# Patient Record
Sex: FEMALE | Race: BLACK OR AFRICAN AMERICAN | NOT HISPANIC OR LATINO | URBAN - METROPOLITAN AREA
[De-identification: names, ages, dates, MRNs, and addresses within clinical notes are randomized per-mention and may not be internally consistent; named-entity substitution may affect disease eponyms.]

---

## 2018-11-30 ENCOUNTER — OUTPATIENT (OUTPATIENT)
Dept: OUTPATIENT SERVICES | Facility: HOSPITAL | Age: 38
LOS: 1 days | End: 2018-11-30

## 2018-11-30 VITALS
WEIGHT: 203.93 LBS | RESPIRATION RATE: 14 BRPM | OXYGEN SATURATION: 99 % | SYSTOLIC BLOOD PRESSURE: 128 MMHG | DIASTOLIC BLOOD PRESSURE: 78 MMHG | TEMPERATURE: 97 F | HEIGHT: 66 IN | HEART RATE: 65 BPM

## 2018-11-30 DIAGNOSIS — D25.9 LEIOMYOMA OF UTERUS, UNSPECIFIED: ICD-10-CM

## 2018-11-30 DIAGNOSIS — Z98.890 OTHER SPECIFIED POSTPROCEDURAL STATES: Chronic | ICD-10-CM

## 2018-11-30 LAB
BLD GP AB SCN SERPL QL: NEGATIVE — SIGNIFICANT CHANGE UP
HCT VFR BLD CALC: 33.5 % — LOW (ref 34.5–45)
HGB BLD-MCNC: 10.5 G/DL — LOW (ref 11.5–15.5)
MCHC RBC-ENTMCNC: 25.9 PG — LOW (ref 27–34)
MCHC RBC-ENTMCNC: 31.3 % — LOW (ref 32–36)
MCV RBC AUTO: 82.7 FL — SIGNIFICANT CHANGE UP (ref 80–100)
NRBC # FLD: 0 — SIGNIFICANT CHANGE UP
PLATELET # BLD AUTO: 287 K/UL — SIGNIFICANT CHANGE UP (ref 150–400)
PMV BLD: 11.7 FL — SIGNIFICANT CHANGE UP (ref 7–13)
RBC # BLD: 4.05 M/UL — SIGNIFICANT CHANGE UP (ref 3.8–5.2)
RBC # FLD: 14.9 % — HIGH (ref 10.3–14.5)
RH IG SCN BLD-IMP: POSITIVE — SIGNIFICANT CHANGE UP
WBC # BLD: 5.89 K/UL — SIGNIFICANT CHANGE UP (ref 3.8–10.5)
WBC # FLD AUTO: 5.89 K/UL — SIGNIFICANT CHANGE UP (ref 3.8–10.5)

## 2018-11-30 RX ORDER — SODIUM CHLORIDE 9 MG/ML
1000 INJECTION, SOLUTION INTRAVENOUS
Qty: 0 | Refills: 0 | Status: DISCONTINUED | OUTPATIENT
Start: 2018-12-17 | End: 2018-12-19

## 2018-11-30 NOTE — H&P PST ADULT - NEGATIVE GASTROINTESTINAL SYMPTOMS
no melena/no vomiting/no diarrhea/no constipation/no change in bowel habits/no nausea/no abdominal pain

## 2018-11-30 NOTE — H&P PST ADULT - NEGATIVE CARDIOVASCULAR SYMPTOMS
no paroxysmal nocturnal dyspnea/no palpitations/no dyspnea on exertion/no peripheral edema/no orthopnea/no claudication

## 2018-11-30 NOTE — H&P PST ADULT - HISTORY OF PRESENT ILLNESS
38 y/0  female with hx uterine fibroids, s/p Myomectomy 2014, present for preop evaluation for recurrent uterine fibroids. Pt states heavy vaginal bleeding with her menses started >1 year ago. Pt was referred for further evaluation; seen by Dr Alves, had sonogram/ transvaginal with difficulty to visualized internally. Now scheduled for Myomectomy on 12/17/18.  pt has been Anemic & on Iron supplement. 38 y/0  female with hx uterine fibroids, s/p Myomectomy 2014, present for preop evaluation for recurrent uterine fibroids. Pt states heavy vaginal bleeding with her menses started >1 year ago. Pt was referred for further evaluation; seen by Dr Alves, had sonogram/ transvaginal with difficulty to visualized internally. Now scheduled for Abdominal Myomectomy on 12/17/18.  Pt has been Anemic & now on Iron supplement.

## 2018-11-30 NOTE — H&P PST ADULT - NEGATIVE GENERAL GENITOURINARY SYMPTOMS
no urinary hesitancy/no dysuria/no incontinence/no hematuria/no renal colic/no flank pain L/no gas in urine

## 2018-11-30 NOTE — H&P PST ADULT - NEGATIVE MUSCULOSKELETAL SYMPTOMS
no arm pain L/no neck pain/no back pain/no joint swelling/no stiffness no back pain/no joint swelling/no stiffness/no arm pain L/no arthritis/no arthralgia/no neck pain

## 2018-11-30 NOTE — H&P PST ADULT - NSANTHOSAYNRD_GEN_A_CORE
No. IBETH screening performed.  STOP BANG Legend: 0-2 = LOW Risk; 3-4 = INTERMEDIATE Risk; 5-8 = HIGH Risk

## 2018-11-30 NOTE — H&P PST ADULT - ATTENDING COMMENTS
39 yo presents for abdominal myomectomy due to symptomatic fibroid uterus. The patient has heavy menses, pelvic pressure and increased abdominal girth causing extreme discomfort.  The patient has had abdominal myomectomy in the past and states about 11 fibroids were removed. Risks, benefits and alternatives of repeat myomectomy discussed with patient. Pelvic ultrasound shows many intramural and subserosal myomas the largest 12cm in diameter.  The decision was made to proceed with another myomectomy.  Risks include but not limited to scar tissue formation, intraop acute blood loss, infection, injury to adjacent organs. All questions and concerns addressed to full satisfaction. Anticipate uncomplicated intraop and postop course. MBeauvil

## 2018-11-30 NOTE — H&P PST ADULT - RS GEN PE MLT RESP DETAILS PC
airway patent/no rales/clear to auscultation bilaterally/no wheezes/breath sounds equal/no rhonchi/respirations non-labored

## 2018-11-30 NOTE — H&P PST ADULT - NEGATIVE ENMT SYMPTOMS
no sinus symptoms/no nasal congestion/no nasal obstruction/no post-nasal discharge/no dry mouth/no tinnitus/no gum bleeding/no vertigo/no ear pain/no hearing difficulty

## 2018-11-30 NOTE — H&P PST ADULT - FAMILY HISTORY
Mother  Still living? Yes, Estimated age: Age Unknown  Family history of hypertension in mother, Age at diagnosis: Age Unknown  Family history of diabetes mellitus (DM), Age at diagnosis: Age Unknown  Family history of AICD (automatic internal cardiac defibrillator), Age at diagnosis: Age Unknown     Father  Still living? Yes, Estimated age: Age Unknown  Family history of prostate cancer in father, Age at diagnosis: Age Unknown     Sibling  Still living? Yes, Estimated age: Age Unknown  Family history of lymphoma, Age at diagnosis: Age Unknown

## 2018-11-30 NOTE — H&P PST ADULT - NEGATIVE NEUROLOGICAL SYMPTOMS
no focal seizures/no tremors/no loss of sensation/no headache/no weakness/no vertigo no headache/no focal seizures/no vertigo/no weakness/no tremors/no loss of sensation/no difficulty walking

## 2018-12-17 ENCOUNTER — INPATIENT (INPATIENT)
Facility: HOSPITAL | Age: 38
LOS: 2 days | Discharge: HOME | End: 2018-12-20
Attending: OBSTETRICS & GYNECOLOGY | Admitting: OBSTETRICS & GYNECOLOGY
Payer: MEDICAID

## 2018-12-17 VITALS
HEART RATE: 75 BPM | RESPIRATION RATE: 16 BRPM | TEMPERATURE: 99 F | SYSTOLIC BLOOD PRESSURE: 122 MMHG | HEIGHT: 66 IN | WEIGHT: 203.93 LBS | DIASTOLIC BLOOD PRESSURE: 63 MMHG | OXYGEN SATURATION: 100 %

## 2018-12-17 DIAGNOSIS — D25.9 LEIOMYOMA OF UTERUS, UNSPECIFIED: ICD-10-CM

## 2018-12-17 DIAGNOSIS — Z98.890 OTHER SPECIFIED POSTPROCEDURAL STATES: Chronic | ICD-10-CM

## 2018-12-17 LAB
BASE EXCESS BLDV CALC-SCNC: -1.5 MMOL/L — SIGNIFICANT CHANGE UP
BLD GP AB SCN SERPL QL: NEGATIVE — SIGNIFICANT CHANGE UP
GAS PNL BLDV: 139 MMOL/L — SIGNIFICANT CHANGE UP (ref 136–146)
GLUCOSE BLDV-MCNC: 114 — HIGH (ref 70–99)
HCG UR QL: NEGATIVE — SIGNIFICANT CHANGE UP
HCO3 BLDV-SCNC: 23 MMOL/L — SIGNIFICANT CHANGE UP (ref 20–27)
HCT VFR BLDV CALC: 27.5 % — LOW (ref 34.5–45)
HGB BLDV-MCNC: 8.9 G/DL — LOW (ref 11.5–15.5)
PCO2 BLDV: 43 MMHG — SIGNIFICANT CHANGE UP (ref 41–51)
PH BLDV: 7.35 PH — SIGNIFICANT CHANGE UP (ref 7.32–7.43)
PO2 BLDV: 54 MMHG — HIGH (ref 35–40)
POTASSIUM BLDV-SCNC: 3.8 MMOL/L — SIGNIFICANT CHANGE UP (ref 3.4–4.5)
RH IG SCN BLD-IMP: POSITIVE — SIGNIFICANT CHANGE UP
SAO2 % BLDV: 84.1 % — SIGNIFICANT CHANGE UP (ref 60–85)

## 2018-12-17 PROCEDURE — 88305 TISSUE EXAM BY PATHOLOGIST: CPT | Mod: 26

## 2018-12-17 RX ORDER — OMEGA-3 ACID ETHYL ESTERS 1 G
1 CAPSULE ORAL
Qty: 0 | Refills: 0 | COMMUNITY

## 2018-12-17 RX ORDER — POTASSIUM CHLORIDE 20 MEQ
10 PACKET (EA) ORAL ONCE
Qty: 0 | Refills: 0 | Status: COMPLETED | OUTPATIENT
Start: 2018-12-17 | End: 2018-12-17

## 2018-12-17 RX ORDER — NALOXONE HYDROCHLORIDE 4 MG/.1ML
0.1 SPRAY NASAL
Qty: 0 | Refills: 0 | Status: DISCONTINUED | OUTPATIENT
Start: 2018-12-17 | End: 2018-12-18

## 2018-12-17 RX ORDER — HYDROMORPHONE HYDROCHLORIDE 2 MG/ML
0.5 INJECTION INTRAMUSCULAR; INTRAVENOUS; SUBCUTANEOUS
Qty: 0 | Refills: 0 | Status: DISCONTINUED | OUTPATIENT
Start: 2018-12-17 | End: 2018-12-17

## 2018-12-17 RX ORDER — SODIUM CHLORIDE 9 MG/ML
1000 INJECTION, SOLUTION INTRAVENOUS
Qty: 0 | Refills: 0 | Status: DISCONTINUED | OUTPATIENT
Start: 2018-12-17 | End: 2018-12-17

## 2018-12-17 RX ORDER — ONDANSETRON 8 MG/1
4 TABLET, FILM COATED ORAL EVERY 6 HOURS
Qty: 0 | Refills: 0 | Status: DISCONTINUED | OUTPATIENT
Start: 2018-12-17 | End: 2018-12-18

## 2018-12-17 RX ORDER — HYDROMORPHONE HYDROCHLORIDE 2 MG/ML
30 INJECTION INTRAMUSCULAR; INTRAVENOUS; SUBCUTANEOUS
Qty: 0 | Refills: 0 | Status: DISCONTINUED | OUTPATIENT
Start: 2018-12-17 | End: 2018-12-18

## 2018-12-17 RX ORDER — ONDANSETRON 8 MG/1
4 TABLET, FILM COATED ORAL ONCE
Qty: 0 | Refills: 0 | Status: DISCONTINUED | OUTPATIENT
Start: 2018-12-17 | End: 2018-12-17

## 2018-12-17 RX ORDER — METOPROLOL TARTRATE 50 MG
5 TABLET ORAL ONCE
Qty: 0 | Refills: 0 | Status: COMPLETED | OUTPATIENT
Start: 2018-12-17 | End: 2018-12-17

## 2018-12-17 RX ORDER — HYDROMORPHONE HYDROCHLORIDE 2 MG/ML
1 INJECTION INTRAMUSCULAR; INTRAVENOUS; SUBCUTANEOUS
Qty: 0 | Refills: 0 | Status: DISCONTINUED | OUTPATIENT
Start: 2018-12-17 | End: 2018-12-17

## 2018-12-17 RX ORDER — MAGNESIUM SULFATE 500 MG/ML
2 VIAL (ML) INJECTION ONCE
Qty: 0 | Refills: 0 | Status: COMPLETED | OUTPATIENT
Start: 2018-12-17 | End: 2018-12-17

## 2018-12-17 RX ORDER — HYDROMORPHONE HYDROCHLORIDE 2 MG/ML
0.5 INJECTION INTRAMUSCULAR; INTRAVENOUS; SUBCUTANEOUS
Qty: 0 | Refills: 0 | Status: DISCONTINUED | OUTPATIENT
Start: 2018-12-17 | End: 2018-12-18

## 2018-12-17 RX ORDER — SODIUM CHLORIDE 9 MG/ML
1000 INJECTION, SOLUTION INTRAVENOUS
Qty: 0 | Refills: 0 | Status: DISCONTINUED | OUTPATIENT
Start: 2018-12-17 | End: 2018-12-19

## 2018-12-17 RX ADMIN — HYDROMORPHONE HYDROCHLORIDE 30 MILLILITER(S): 2 INJECTION INTRAMUSCULAR; INTRAVENOUS; SUBCUTANEOUS at 23:49

## 2018-12-17 RX ADMIN — Medication 50 GRAM(S): at 21:45

## 2018-12-17 RX ADMIN — Medication 5 MILLIGRAM(S): at 21:45

## 2018-12-17 RX ADMIN — Medication 100 MILLIEQUIVALENT(S): at 21:45

## 2018-12-17 RX ADMIN — HYDROMORPHONE HYDROCHLORIDE 0.5 MILLIGRAM(S): 2 INJECTION INTRAMUSCULAR; INTRAVENOUS; SUBCUTANEOUS at 21:15

## 2018-12-17 RX ADMIN — HYDROMORPHONE HYDROCHLORIDE 30 MILLILITER(S): 2 INJECTION INTRAMUSCULAR; INTRAVENOUS; SUBCUTANEOUS at 20:51

## 2018-12-17 RX ADMIN — SODIUM CHLORIDE 30 MILLILITER(S): 9 INJECTION, SOLUTION INTRAVENOUS at 22:01

## 2018-12-17 RX ADMIN — HYDROMORPHONE HYDROCHLORIDE 0.5 MILLIGRAM(S): 2 INJECTION INTRAMUSCULAR; INTRAVENOUS; SUBCUTANEOUS at 20:51

## 2018-12-17 NOTE — BRIEF OPERATIVE NOTE - OPERATION/FINDINGS
Extension of lower midline exploratory laparotomy, lysis of adhesions between colon and posterior uterus, and lysis of the bladder off the anterior uterus/fibroid. L ureterolysis posterior to the uterus. Bladder interrogated with methylene blue, distended nicely with out evidence of leak.     The largest fibroid (anterior) was partially resected because the L portion was potentially involving the ureter (ureterolysis would have been required) and the risk was not considered worth the benefit.
14 week sized myomatous uterus.  Extensive adhesions of posterior uterine wall to bowel and anterior lower uterine segment to bladder.  Largest myoma in lower uterine segment anteriorly.

## 2018-12-17 NOTE — BRIEF OPERATIVE NOTE - PROCEDURE
<<-----Click on this checkbox to enter Procedure Lysis of adhesions  12/17/2018  colon from posterior uterus, bladder from anterior uterus  Active  [id number]

## 2018-12-17 NOTE — CONSULT NOTE ADULT - ASSESSMENT
39yo F with h/o myomectomy for fibroids, now undergoing exlap, myomectomies, intraop consult for adhesions to sigmoid and bladder. Adhesions were lysed, L ureter was lysed to identify course.     - Care per Gyn  - Pain control  - Diet to be advanced as tolerated  - Ambulate as tolerated  - Will continue to follow  - D/W Dr. Rachelle Boothe  [id number]

## 2018-12-17 NOTE — PATIENT PROFILE ADULT - NSSCCAGEALCOHOLGUILTYABOUT_GEN_A_NUR
Detail Level: Detailed Quality 134: Screening For Clinical Depression And Follow-Up Plan: Depression Screening not documented, reason not given Quality 154 Part A: Falls: Risk Assessment (Should Be Reported With Measure 155.): Falls risk assessment completed and documented in the past 12 months. Quality 431: Preventive Care And Screening: Unhealthy Alcohol Use - Screening: Patient screened for unhealthy alcohol use using a single question and scores less than 2 times per year Quality 130: Documentation Of Current Medications In The Medical Record: Current Medications Documented Quality 226: Preventive Care And Screening: Tobacco Use: Screening And Cessation Intervention: Patient screened for tobacco and is an ex-smoker Quality 110: Preventive Care And Screening: Influenza Immunization: Influenza Immunization not Administered because Patient Refused. no

## 2018-12-17 NOTE — CONSULT NOTE ADULT - SUBJECTIVE AND OBJECTIVE BOX
GENERAL SURGERY CONSULT NOTE  [id number]  B team surgery - [facility]    HPI  39yo F with h/o uterine fibroids s/p myomectomy (11 removed) 2014      PAST MEDICAL & SURGICAL HISTORY:  Anemia  Uterine fibroid  History of myomectomy: 2014      Systemic:	[ ] Fever	[ ] Chills	[ ] Night sweats    [ ] Fatigue	[ ] Other  [] Cardiovascular:  [] Pulmonary:  [] Renal/Urologic:  [] Gastrointestinal:   [] Metabolic:  [] Neurologic:  [] Hematologic:  [] ENT:  [] Ophthalmologic:  [] Musculoskeletal:    MEDICATIONS  (STANDING):  HYDROmorphone PCA (1 mG/mL) 30 milliLiter(s) PCA Continuous PCA Continuous  lactated ringers. 1000 milliLiter(s) (150 mL/Hr) IV Continuous <Continuous>  lactated ringers. 1000 milliLiter(s) (30 mL/Hr) IV Continuous <Continuous>  lactated ringers. 1000 milliLiter(s) (125 mL/Hr) IV Continuous <Continuous>  magnesium sulfate  IVPB 2 Gram(s) IV Intermittent once  metoprolol tartrate Injectable 5 milliGRAM(s) IV Push once  potassium chloride  10 mEq/100 mL IVPB 10 milliEquivalent(s) IV Intermittent once    MEDICATIONS  (PRN):  HYDROmorphone  Injectable 0.5 milliGRAM(s) IV Push every 10 minutes PRN Moderate Pain (4 - 6)  HYDROmorphone  Injectable 1 milliGRAM(s) IV Push every 10 minutes PRN Severe Pain (7 - 10)  HYDROmorphone PCA (1 mG/mL) Rescue Clinician Bolus 0.5 milliGRAM(s) IV Push every 15 minutes PRN for Pain Scale GREATER THAN 6  naloxone Injectable 0.1 milliGRAM(s) IV Push every 3 minutes PRN For ANY of the following changes in patient status:  A. RR LESS THAN 10 breaths per minute, B. Oxygen saturation LESS THAN 90%, C. Sedation score of 6  ondansetron Injectable 4 milliGRAM(s) IV Push every 6 hours PRN Nausea  ondansetron Injectable 4 milliGRAM(s) IV Push once PRN Nausea and/or Vomiting      Allergies    No Known Allergies    Intolerances        SOCIAL HISTORY:    FAMILY HISTORY:  Family history of lymphoma (Sibling)  Family history of AICD (automatic internal cardiac defibrillator) (Mother)  Family history of diabetes mellitus (DM) (Mother)  Family history of prostate cancer in father (Father)  Family history of hypertension in mother (Mother)      Vital Signs Last 24 Hrs  T(C): 36.8 (17 Dec 2018 20:35), Max: 37.1 (17 Dec 2018 13:35)  T(F): 98.2 (17 Dec 2018 20:35), Max: 98.8 (17 Dec 2018 13:35)  HR: 78 (17 Dec 2018 21:15) (75 - 84)  BP: 140/57 (17 Dec 2018 21:15) (122/63 - 141/57)  BP(mean): 74 (17 Dec 2018 21:15) (74 - 87)  RR: 19 (17 Dec 2018 21:15) (16 - 26)  SpO2: 100% (17 Dec 2018 21:15) (100% - 100%)    PHYSICAL EXAM:    Constitutional: NAD    Eyes: anicteric    ENMT: no rhinorrhea    Neck: supple    Respiratory: Clear bilaterally, respirations not labored, no retractions    Cardiovascular: RRR, NL S1S2    Gastrointestinal: Soft, ND, NT    Genitourinary: Normal external genitalia    Rectal:    Extremities: No deformities    Vascular: Ext. warm, normal cap refill    Neurological: sensation and motor intact to all extremities    Skin: warm, dry, no rash    Lymph Nodes: no palpable adenopathy      LABS:                RADIOLOGY & ADDITIONAL STUDIES: GENERAL SURGERY CONSULT NOTE  [id number]  B team surgery - [facility]    HPI  37yo F with h/o uterine fibroids s/p myomectomy (11 removed) 2014, taken to the OR today for exploratory laparotomy and resection of fibroids for heavy vaginal bleeding. Intraop, there were adhesions noted posteriorly to the sigmoid, and anteriorly to the bladder. The adhesions were taken down, the left ureter was lysed, and the bladder was interrogated for injury (negative methylene blue leak test). Multiple fibroids were removed during the course of the surgery, including a partial resection of the largest, anterior fibroid.       PAST MEDICAL & SURGICAL HISTORY:  Anemia  Uterine fibroid  History of myomectomy: 2014    ROS: could not be assessed, patient intubated on OR table.     MEDICATIONS  (STANDING):  HYDROmorphone PCA (1 mG/mL) 30 milliLiter(s) PCA Continuous PCA Continuous  lactated ringers. 1000 milliLiter(s) (150 mL/Hr) IV Continuous <Continuous>  lactated ringers. 1000 milliLiter(s) (30 mL/Hr) IV Continuous <Continuous>  lactated ringers. 1000 milliLiter(s) (125 mL/Hr) IV Continuous <Continuous>  magnesium sulfate  IVPB 2 Gram(s) IV Intermittent once  metoprolol tartrate Injectable 5 milliGRAM(s) IV Push once  potassium chloride  10 mEq/100 mL IVPB 10 milliEquivalent(s) IV Intermittent once    MEDICATIONS  (PRN):  HYDROmorphone  Injectable 0.5 milliGRAM(s) IV Push every 10 minutes PRN Moderate Pain (4 - 6)  HYDROmorphone  Injectable 1 milliGRAM(s) IV Push every 10 minutes PRN Severe Pain (7 - 10)  HYDROmorphone PCA (1 mG/mL) Rescue Clinician Bolus 0.5 milliGRAM(s) IV Push every 15 minutes PRN for Pain Scale GREATER THAN 6  naloxone Injectable 0.1 milliGRAM(s) IV Push every 3 minutes PRN For ANY of the following changes in patient status:  A. RR LESS THAN 10 breaths per minute, B. Oxygen saturation LESS THAN 90%, C. Sedation score of 6  ondansetron Injectable 4 milliGRAM(s) IV Push every 6 hours PRN Nausea  ondansetron Injectable 4 milliGRAM(s) IV Push once PRN Nausea and/or Vomiting      Allergies  No Known Allergies    SOCIAL HISTORY:   Single, no children, grad student    FAMILY HISTORY:  Family history of lymphoma (Sibling)  Family history of AICD (automatic internal cardiac defibrillator) (Mother)  Family history of diabetes mellitus (DM) (Mother)  Family history of prostate cancer in father (Father)  Family history of hypertension in mother (Mother)      Vital Signs Last 24 Hrs  T(C): 36.8 (17 Dec 2018 20:35), Max: 37.1 (17 Dec 2018 13:35)  T(F): 98.2 (17 Dec 2018 20:35), Max: 98.8 (17 Dec 2018 13:35)  HR: 78 (17 Dec 2018 21:15) (75 - 84)  BP: 140/57 (17 Dec 2018 21:15) (122/63 - 141/57)  BP(mean): 74 (17 Dec 2018 21:15) (74 - 87)  RR: 19 (17 Dec 2018 21:15) (16 - 26)  SpO2: 100% (17 Dec 2018 21:15) (100% - 100%)    PHYSICAL EXAM:    Constitutional: Intubated on OR table    Respiratory: respirations not labored, no retractions, on vent    Cardiovascular: RRR, NL S1S2 with pvc's    Gastrointestinal: s/p ex lap, prior pfannenstiel surgical scar    Skin: warm, dry, no rash    LABS:    RADIOLOGY & ADDITIONAL STUDIES:

## 2018-12-18 DIAGNOSIS — D25.9 LEIOMYOMA OF UTERUS, UNSPECIFIED: ICD-10-CM

## 2018-12-18 LAB
HCT VFR BLD CALC: 25.5 % — LOW (ref 34.5–45)
HCT VFR BLD CALC: 27.9 % — LOW (ref 34.5–45)
HGB BLD-MCNC: 8.3 G/DL — LOW (ref 11.5–15.5)
HGB BLD-MCNC: 8.6 G/DL — LOW (ref 11.5–15.5)
MCHC RBC-ENTMCNC: 25.9 PG — LOW (ref 27–34)
MCHC RBC-ENTMCNC: 26.3 PG — LOW (ref 27–34)
MCHC RBC-ENTMCNC: 30.8 % — LOW (ref 32–36)
MCHC RBC-ENTMCNC: 32.5 % — SIGNIFICANT CHANGE UP (ref 32–36)
MCV RBC AUTO: 81 FL — SIGNIFICANT CHANGE UP (ref 80–100)
MCV RBC AUTO: 84 FL — SIGNIFICANT CHANGE UP (ref 80–100)
NRBC # FLD: 0 — SIGNIFICANT CHANGE UP
NRBC # FLD: 0 — SIGNIFICANT CHANGE UP
PLATELET # BLD AUTO: 209 K/UL — SIGNIFICANT CHANGE UP (ref 150–400)
PLATELET # BLD AUTO: 214 K/UL — SIGNIFICANT CHANGE UP (ref 150–400)
PMV BLD: 11.6 FL — SIGNIFICANT CHANGE UP (ref 7–13)
PMV BLD: 12 FL — SIGNIFICANT CHANGE UP (ref 7–13)
RBC # BLD: 3.15 M/UL — LOW (ref 3.8–5.2)
RBC # BLD: 3.32 M/UL — LOW (ref 3.8–5.2)
RBC # FLD: 14.4 % — SIGNIFICANT CHANGE UP (ref 10.3–14.5)
RBC # FLD: 14.4 % — SIGNIFICANT CHANGE UP (ref 10.3–14.5)
WBC # BLD: 9.39 K/UL — SIGNIFICANT CHANGE UP (ref 3.8–10.5)
WBC # BLD: 9.57 K/UL — SIGNIFICANT CHANGE UP (ref 3.8–10.5)
WBC # FLD AUTO: 9.39 K/UL — SIGNIFICANT CHANGE UP (ref 3.8–10.5)
WBC # FLD AUTO: 9.57 K/UL — SIGNIFICANT CHANGE UP (ref 3.8–10.5)

## 2018-12-18 RX ORDER — OXYCODONE HYDROCHLORIDE 5 MG/1
10 TABLET ORAL
Qty: 0 | Refills: 0 | Status: DISCONTINUED | OUTPATIENT
Start: 2018-12-18 | End: 2018-12-20

## 2018-12-18 RX ORDER — ACETAMINOPHEN 500 MG
650 TABLET ORAL EVERY 6 HOURS
Qty: 0 | Refills: 0 | Status: COMPLETED | OUTPATIENT
Start: 2018-12-18 | End: 2018-12-20

## 2018-12-18 RX ORDER — DOCUSATE SODIUM 100 MG
100 CAPSULE ORAL THREE TIMES A DAY
Qty: 0 | Refills: 0 | Status: DISCONTINUED | OUTPATIENT
Start: 2018-12-18 | End: 2018-12-20

## 2018-12-18 RX ORDER — ACETAMINOPHEN 500 MG
1000 TABLET ORAL ONCE
Qty: 0 | Refills: 0 | Status: COMPLETED | OUTPATIENT
Start: 2018-12-18 | End: 2018-12-18

## 2018-12-18 RX ORDER — OXYCODONE HYDROCHLORIDE 5 MG/1
5 TABLET ORAL
Qty: 0 | Refills: 0 | Status: DISCONTINUED | OUTPATIENT
Start: 2018-12-18 | End: 2018-12-20

## 2018-12-18 RX ORDER — HEPARIN SODIUM 5000 [USP'U]/ML
5000 INJECTION INTRAVENOUS; SUBCUTANEOUS EVERY 12 HOURS
Qty: 0 | Refills: 0 | Status: DISCONTINUED | OUTPATIENT
Start: 2018-12-18 | End: 2018-12-20

## 2018-12-18 RX ADMIN — OXYCODONE HYDROCHLORIDE 10 MILLIGRAM(S): 5 TABLET ORAL at 14:45

## 2018-12-18 RX ADMIN — OXYCODONE HYDROCHLORIDE 10 MILLIGRAM(S): 5 TABLET ORAL at 17:48

## 2018-12-18 RX ADMIN — Medication 1000 MILLIGRAM(S): at 05:40

## 2018-12-18 RX ADMIN — OXYCODONE HYDROCHLORIDE 10 MILLIGRAM(S): 5 TABLET ORAL at 18:14

## 2018-12-18 RX ADMIN — Medication 650 MILLIGRAM(S): at 14:00

## 2018-12-18 RX ADMIN — HYDROMORPHONE HYDROCHLORIDE 30 MILLILITER(S): 2 INJECTION INTRAMUSCULAR; INTRAVENOUS; SUBCUTANEOUS at 07:26

## 2018-12-18 RX ADMIN — Medication 400 MILLIGRAM(S): at 05:10

## 2018-12-18 RX ADMIN — Medication 650 MILLIGRAM(S): at 18:14

## 2018-12-18 RX ADMIN — ONDANSETRON 4 MILLIGRAM(S): 8 TABLET, FILM COATED ORAL at 09:37

## 2018-12-18 RX ADMIN — Medication 650 MILLIGRAM(S): at 12:52

## 2018-12-18 RX ADMIN — HEPARIN SODIUM 5000 UNIT(S): 5000 INJECTION INTRAVENOUS; SUBCUTANEOUS at 07:26

## 2018-12-18 RX ADMIN — OXYCODONE HYDROCHLORIDE 10 MILLIGRAM(S): 5 TABLET ORAL at 21:50

## 2018-12-18 RX ADMIN — HEPARIN SODIUM 5000 UNIT(S): 5000 INJECTION INTRAVENOUS; SUBCUTANEOUS at 20:01

## 2018-12-18 RX ADMIN — Medication 650 MILLIGRAM(S): at 17:34

## 2018-12-18 RX ADMIN — OXYCODONE HYDROCHLORIDE 10 MILLIGRAM(S): 5 TABLET ORAL at 15:54

## 2018-12-18 RX ADMIN — OXYCODONE HYDROCHLORIDE 10 MILLIGRAM(S): 5 TABLET ORAL at 20:50

## 2018-12-18 RX ADMIN — SODIUM CHLORIDE 125 MILLILITER(S): 9 INJECTION, SOLUTION INTRAVENOUS at 07:26

## 2018-12-18 NOTE — PROGRESS NOTE ADULT - SUBJECTIVE AND OBJECTIVE BOX
B Team (General Surgery) Daily Progress Note    SUBJECTIVE:  Pt seen and examined this AM. No acute events overnight. Reports of soreness of abdomen but " nothing crazy". Otherwise still NPO, but denied fever, chills, CP or SOB.     OBJECTIVE:  Vital Signs Last 24 Hrs  T(C): 37.9 (18 Dec 2018 04:56), Max: 37.9 (18 Dec 2018 04:56)  T(F): 100.3 (18 Dec 2018 04:56), Max: 100.3 (18 Dec 2018 04:56)  HR: 94 (18 Dec 2018 04:56) (74 - 94)  BP: 133/50 (18 Dec 2018 04:56) (120/49 - 146/52)  BP(mean): 80 (17 Dec 2018 22:45) (69 - 87)  RR: 16 (18 Dec 2018 04:56) (16 - 26)  SpO2: 98% (18 Dec 2018 04:56) (98% - 100%)    I&O's Detail    17 Dec 2018 07:01  -  18 Dec 2018 06:57  --------------------------------------------------------  IN:    IV PiggyBack: 150 mL    lactated ringers.: 1000 mL    lactated ringers.: 250 mL  Total IN: 1400 mL    OUT:    Indwelling Catheter - Urethral: 750 mL  Total OUT: 750 mL    Total NET: 650 mL        Exam:  GEN: A&Ox3, NAD  HEENT: atraumatic, normocephalic  CV: no JVD  RESP: no increased work of breathing, no use of accessory muscles  GI/ABD: soft, appropriately tender, mildly distended, dressing CDI  : deferred  EXTREMITIES: warm, pink, well-perfused

## 2018-12-18 NOTE — DISCHARGE NOTE ADULT - ADDITIONAL INSTRUCTIONS
Please make an appointment with Dr. Alves in 1 week for a repeat CBC and in 2 weeks for a postoperative assessment. Please take Iron three times per day with stool softeners and vitamin C as needed. A prescription of oxycodone for pain was sent to your preferred pharmacy.

## 2018-12-18 NOTE — DISCHARGE NOTE ADULT - CARE PLAN
Principal Discharge DX:	Leiomyoma of uterus  Goal:	postoperative recovery  Assessment and plan of treatment:	nothing in vagina (sex, tampons, douching) no heavy lifting (>10 lbs) for 6 weeks. Please return to ER or call your doctors office if pain is worsening, you are unable to keep down food or drink, fever >101, heavy vaginal bleeding. You are able to take a shower regularly.

## 2018-12-18 NOTE — PROGRESS NOTE ADULT - ASSESSMENT
A: POD#1 s/p Abdominal myomectomy, extensive SHANNON, clinically stable  P: DC PCA      PO analgesia     Encourage ambulation     Advance diet as tolerated. Encouraged PO hydration, tea, soup     Will continue to monitor

## 2018-12-18 NOTE — PROGRESS NOTE ADULT - ASSESSMENT
39yo F with h/o myomectomy for fibroids, now POD 1 s/p exlap, myomectomies, intraop consult for adhesions to sigmoid and bladder. Adhesions were lysed, L ureter was lysed to identify course. Recovering withouit issue    - Care per Gyn  - Pain control  - Diet to be advanced as tolerated  - Ambulate as tolerated  - Reconsult as needed  - Plan to be discussed with Dr. Rachelle Boothe  [id number] 37yo F with h/o myomectomy for fibroids, now POD 1 s/p exlap, myomectomies, intraop consult for adhesions to sigmoid and bladder. Adhesions were lysed, L ureter was lysed to identify course. Recovering withouit issue    - Care per Gyn  - Pain control  - Diet to be advanced as tolerated  - Ambulate as tolerated  - Plan to be discussed with Dr. Bush

## 2018-12-18 NOTE — CHART NOTE - NSCHARTNOTEFT_GEN_A_CORE
Post Operative Note      Procedure:  Lysis of adhesions of colon from posterior uterus, bladder from anterior uterus     Subjective: Patient seen and examined.  No acute events since OR.  Pain controlled.  Denies nausea or vomiting.    Objective:    T(C): 37.4 (12-18-18 @ 01:38), Max: 37.4 (12-18-18 @ 01:38)  HR: 92 (12-18-18 @ 01:38) (74 - 92)  BP: 121/49 (12-18-18 @ 01:38) (120/49 - 146/52)  RR: 16 (12-18-18 @ 01:38) (16 - 26)  SpO2: 100% (12-18-18 @ 01:38) (98% - 100%)      12-17-18 @ 07:01  -  12-18-18 @ 02:13  --------------------------------------------------------  IN: 900 mL / OUT: 400 mL / NET: 500 mL        Physical Exam:  Gen: AAOx3, NAD  CVS: RRR  Resp: nonlabored on RA  Abdomen: abdominal binder in place, midline dressing c/d/i, abdomen soft, NT    Assessment/Plan: 39 y/o female s/p exploratory laparotomy and resection of fibroids for heavy vaginal bleeding with OB/gyn, s/p Lysis of adhesions of colon from posterior uterus, and bladder from anterior uterus.     - Care as per primary team

## 2018-12-18 NOTE — DISCHARGE NOTE ADULT - MEDICATION SUMMARY - MEDICATIONS TO TAKE
I will START or STAY ON the medications listed below when I get home from the hospital:    MVI 1 tab orally daily  last dose  12/10  -- Indication: For Home medication    Iron 1 tab orally daily  -- Indication: For Home medication    resveratrol 1 tab daily  last dose 12/10  -- Indication: For Home medication    Wombenzyme 1 tab daily  last dose 12/10  -- Indication: For Home medication    ibuprofen 600 mg oral tablet  -- 1 tab(s) by mouth every 6 hours  -- Indication: For Pain    Fish Oil oral capsule  -- 1 cap(s) by mouth once a day  last dose 12/10  -- Indication: For Home medication I will START or STAY ON the medications listed below when I get home from the hospital:    MVI 1 tab orally daily  last dose  12/10  -- Indication: For Home medication    Iron 1 tab orally daily  -- Indication: For Home medication    resveratrol 1 tab daily  last dose 12/10  -- Indication: For Home medication    Wombenzyme 1 tab daily  last dose 12/10  -- Indication: For Home medication    ibuprofen 600 mg oral tablet  -- 1 tab(s) by mouth every 6 hours  -- Indication: For Pain    oxyCODONE 5 mg oral tablet  -- 1 tab(s) by mouth every 6 hours x 5 days, As Needed -Moderate Pain (4 - 6) MDD:4   -- Indication: For Pain    ferrous sulfate 200 mg (65 mg elemental iron) oral tablet  -- 1 tab(s) by mouth 3 times a day  -- Indication: For Postoperative state    Fish Oil oral capsule  -- 1 cap(s) by mouth once a day  last dose 12/10  -- Indication: For Home medication    Vitamin C  -- 1 tab(s) by mouth 3 times a day  -- Indication: For Postoperative state

## 2018-12-18 NOTE — PROGRESS NOTE ADULT - SUBJECTIVE AND OBJECTIVE BOX
Anesthesia Pain Management Service    SUBJECTIVE: I'm feeling nauseous     Pain Scale Score	At rest: _4__ 	With Activity: ___ 	[X ] Refer to charted pain scores    THERAPY:    [ ] IV PCA Morphine		[ ] 5 mg/mL	[ ] 1 mg/mL  [X ] IV PCA Hydromorphone	[ ] 5 mg/mL	[X ] 1 mg/mL  [ ] IV PCA Fentanyl		[ ] 50 micrograms/mL    Demand dose __0.2_ lockout __6_ (minutes) Continuous Rate _0__ Total: _3.6__   mg used (in past 24 hrs)      MEDICATIONS  (STANDING):  acetaminophen   Tablet .. 650 milliGRAM(s) Oral every 6 hours  heparin  Injectable 5000 Unit(s) SubCutaneous every 12 hours  lactated ringers. 1000 milliLiter(s) (30 mL/Hr) IV Continuous <Continuous>  lactated ringers. 1000 milliLiter(s) (125 mL/Hr) IV Continuous <Continuous>    MEDICATIONS  (PRN):  oxyCODONE    IR 5 milliGRAM(s) Oral every 3 hours PRN Moderate Pain (4 - 6)  oxyCODONE    IR 10 milliGRAM(s) Oral every 3 hours PRN Severe Pain (7 - 10)      OBJECTIVE: sitting in chair     Sedation Score:	[ X] Alert	[ ] Drowsy 	[ ] Arousable	[ ] Asleep	[ ] Unresponsive    Side Effects:	[ ] None	[X ] Nausea	[ ] Vomiting	[ ] Pruritus  		[ ] Other:    Vital Signs Last 24 Hrs  T(C): 36.7 (18 Dec 2018 10:25), Max: 37.9 (18 Dec 2018 04:56)  T(F): 98.1 (18 Dec 2018 10:25), Max: 100.3 (18 Dec 2018 04:56)  HR: 79 (18 Dec 2018 10:25) (74 - 94)  BP: 105/44 (18 Dec 2018 10:25) (105/44 - 146/52)  BP(mean): 80 (17 Dec 2018 22:45) (69 - 87)  RR: 17 (18 Dec 2018 10:25) (16 - 26)  SpO2: 99% (18 Dec 2018 10:25) (98% - 100%)    ASSESSMENT/ PLAN    Therapy to  be:	[ ] Continue   [ X] Discontinued   [X ] Change to prn Analgesics    Documentation and Verification of current medications:   [X] Done	[ ] Not done, not elligible    Comments: PRN Oral/IV opioids and/or Adjuvant medication to be ordered at this point.

## 2018-12-18 NOTE — DISCHARGE NOTE ADULT - INSTRUCTIONS
notify md if having temperature above 101,if not tolerating diet ,nauseous ,vomiting ,if incision site looks red ,swollen or discharge noted

## 2018-12-18 NOTE — DISCHARGE NOTE ADULT - HOSPITAL COURSE
37 y/o s/p abdominal myomectomy, extensive lysis of adhesions 2/2 uterine fibroid. EBL: 900. HCT 33.5->25.5.  See operative note for details of procedure.      POD#0, patient was advanced to a regular diet.  POD#1, Meade was discontinued and patient ____.  Patient was discharged on POD#___ in stable condition with adequate pain control, ambulating, tolerating PO and voiding spontaneously.      Patient to follow up with Dr. Alves in 2 weeks. 37 y/o s/p abdominal myomectomy, extensive lysis of adhesions 2/2 uterine fibroid. EBL: 900. HCT 33.5->25.5->27.9->24.8->21.2.  See operative note for details of procedure.      POD#0: patient was advanced to a regular diet.  POD#1, Meade was discontinued and patient voided spontaneously.   POD#2: Patient was febrile to 38.4 at 2AM and 38.3 at 6AM. Blood and urine cultures were acquired. She was treated with Tylenol. She remained afebrile throughout the remainder of the day.  POD#3: Patient remained afebrile. She was meeting all postoperative milestones including ambulating, tolerating PO, voiding spontaneously. Hct dropped from 24.8->21.2. However, she continued to remain asymptomatic and hemodynamically stable. She was discharged in stable condition.     Patient to follow up with Dr. Alves in 1-2 weeks for repeat CBC and postoperative evaluation.

## 2018-12-18 NOTE — PROGRESS NOTE ADULT - PROBLEM SELECTOR PLAN 1
Neuro: PCA for  pain.  Can consider transition to PO meds once tolerating PO.   CV: Hemodynamically stable  Pulm: Saturating well on room air, encourage oob/amb  GI: Advance to clear liquid diet today.     : UOP adequate overnight, d/c reilly.  Heme: start HSQ this AM.   Continue SCD's  FEN: LR@125.  replete electrolytes prn   ID: Afebrile  Endo: No active issues   Dispo: Continue routine post-op care    Pat Shahab PGY-3

## 2018-12-18 NOTE — PROGRESS NOTE ADULT - SUBJECTIVE AND OBJECTIVE BOX
[facility] Progress Note    POD#1   HD#2    Patient seen and examined at bedside.  No acute events overnight. No acute complaints.  Pain well controlled.   Patient was brought to room late last night so has not yet ambulated. Remains NPO.  Not yet passing flatus.  Meade still in place.   Denies CP, SOB, N/V, fevers, and chills.    Vital Signs Last 24 Hours  T(C): 37.9 (12-18-18 @ 04:56), Max: 37.9 (12-18-18 @ 04:56)  HR: 94 (12-18-18 @ 04:56) (74 - 94)  BP: 133/50 (12-18-18 @ 04:56) (120/49 - 146/52)  RR: 16 (12-18-18 @ 04:56) (16 - 26)  SpO2: 98% (12-18-18 @ 04:56) (98% - 100%)    I&O's Summary    17 Dec 2018 07:01  -  18 Dec 2018 06:28  --------------------------------------------------------  IN: 1400 mL / OUT: 400 mL / NET: 1000 mL        Physical Exam:  General: NAD  CV: RR, S1, S2, no M/R/G  Lungs: CTA b/l, good air flow b/l   Abdomen: Soft, mildly-tender to palpation diffusely, softly distended  Incision: midline vertical CDI w/ bandage in place  Ext: No pain or swelling     Labs:      MEDICATIONS  (STANDING):  heparin  Injectable 5000 Unit(s) SubCutaneous every 12 hours  HYDROmorphone PCA (1 mG/mL) 30 milliLiter(s) PCA Continuous PCA Continuous  lactated ringers. 1000 milliLiter(s) (30 mL/Hr) IV Continuous <Continuous>  lactated ringers. 1000 milliLiter(s) (125 mL/Hr) IV Continuous <Continuous>    MEDICATIONS  (PRN):  HYDROmorphone PCA (1 mG/mL) Rescue Clinician Bolus 0.5 milliGRAM(s) IV Push every 15 minutes PRN for Pain Scale GREATER THAN 6  naloxone Injectable 0.1 milliGRAM(s) IV Push every 3 minutes PRN For ANY of the following changes in patient status:  A. RR LESS THAN 10 breaths per minute, B. Oxygen saturation LESS THAN 90%, C. Sedation score of 6  ondansetron Injectable 4 milliGRAM(s) IV Push every 6 hours PRN Nausea

## 2018-12-18 NOTE — DISCHARGE NOTE ADULT - PATIENT PORTAL LINK FT
You can access the [facility] Patient Portal, offered by [facility], by registering with the following website: [email or web address]

## 2018-12-18 NOTE — PROGRESS NOTE ADULT - SUBJECTIVE AND OBJECTIVE BOX
Anesthesia Pain Management Service- Attending Addendum    SUBJECTIVE: Patient's pain control adequate    Therapy:	  [ X] IV PCA	   [ ] Epidural           [ ] s/p Spinal Opoid              [ ] Postpartum infusion	  [ ] Patient controlled regional anesthesia (PCRA)    [ ] prn Analgesics    Allergies    No Known Allergies    Intolerances      MEDICATIONS  (STANDING):  acetaminophen   Tablet .. 650 milliGRAM(s) Oral every 6 hours  heparin  Injectable 5000 Unit(s) SubCutaneous every 12 hours  lactated ringers. 1000 milliLiter(s) (30 mL/Hr) IV Continuous <Continuous>  lactated ringers. 1000 milliLiter(s) (125 mL/Hr) IV Continuous <Continuous>    MEDICATIONS  (PRN):  oxyCODONE    IR 5 milliGRAM(s) Oral every 3 hours PRN Moderate Pain (4 - 6)  oxyCODONE    IR 10 milliGRAM(s) Oral every 3 hours PRN Severe Pain (7 - 10)      OBJECTIVE:   [X] No new signs     [ ] Other:    Side Effects:  [X ] None			[ ] Other:      ASSESSMENT/PLAN  -Discontinue current therapy    [ ] Therapy changed to:    [ ] IV PCA       [ ] Epidural     [ X] prn Analgesics     Comments: Pain management per primary team, APS to sign off

## 2018-12-18 NOTE — DISCHARGE NOTE ADULT - PLAN OF CARE
postoperative recovery nothing in vagina (sex, tampons, douching) no heavy lifting (>10 lbs) for 6 weeks. Please return to ER or call your doctors office if pain is worsening, you are unable to keep down food or drink, fever >101, heavy vaginal bleeding. You are able to take a shower regularly. Nothing in vagina (sex, tampons, douching) no heavy lifting (>10 lbs) for 6 weeks. Please return to ER or call your doctors office if pain is worsening, you are unable to keep down food or drink, fever >101, heavy vaginal bleeding. You are able to take a shower regularly.

## 2018-12-18 NOTE — PROGRESS NOTE ADULT - SUBJECTIVE AND OBJECTIVE BOX
Patient evaluated at bedside. Reports some nausea earlier today but not vomiting. Tolerating sips of clears. No flatus. Denies shortness of breath, chest pain, palpitations. Out of bed to chair  VS 98.1  p 79  /44    Heent nl  CV RRR  Abd soft Dressing C/D/I  Ext no CCE  labs H/H 8.3/25.5  Neuro AAO x 3

## 2018-12-18 NOTE — DISCHARGE NOTE ADULT - CARE PROVIDER_API CALL
Holley Alves (DO), Obstetrics and Gynecology  [address] [place] [address]  Phone: [phone]  Fax: [phone]

## 2018-12-18 NOTE — DISCHARGE NOTE ADULT - CONDITIONS AT DISCHARGE
vitals stable ,denies any distress ,iv discontinued ,discharge instructions given pt verbalize understanding instructions

## 2018-12-18 NOTE — PROGRESS NOTE ADULT - SUBJECTIVE AND OBJECTIVE BOX
ANESTHESIA POSTOP CHECK    38y Female POSTOP DAY 1    Vital Signs Last 24 Hrs  T(C): 37.4 (18 Dec 2018 16:47), Max: 37.9 (18 Dec 2018 04:56)  T(F): 99.3 (18 Dec 2018 16:47), Max: 100.3 (18 Dec 2018 04:56)  HR: 99 (18 Dec 2018 16:47) (74 - 99)  BP: 131/46 (18 Dec 2018 16:47) (105/44 - 146/52)  BP(mean): 80 (17 Dec 2018 22:45) (69 - 87)  RR: 18 (18 Dec 2018 16:47) (16 - 26)  SpO2: 100% (18 Dec 2018 16:47) (98% - 100%)  I&O's Summary    17 Dec 2018 07:01  -  18 Dec 2018 07:00  --------------------------------------------------------  IN: 1400 mL / OUT: 750 mL / NET: 650 mL    18 Dec 2018 07:01  -  18 Dec 2018 17:51  --------------------------------------------------------  IN: 775 mL / OUT: 300 mL / NET: 475 mL        [X ] NO APPARENT ANESTHESIA COMPLICATIONS      Comments:

## 2018-12-19 DIAGNOSIS — Z98.890 OTHER SPECIFIED POSTPROCEDURAL STATES: ICD-10-CM

## 2018-12-19 LAB
APPEARANCE UR: CLEAR — SIGNIFICANT CHANGE UP
BASOPHILS # BLD AUTO: 0.03 K/UL — SIGNIFICANT CHANGE UP (ref 0–0.2)
BASOPHILS NFR BLD AUTO: 0.3 % — SIGNIFICANT CHANGE UP (ref 0–2)
BILIRUB UR-MCNC: NEGATIVE — SIGNIFICANT CHANGE UP
BLOOD UR QL VISUAL: HIGH
COLOR SPEC: SIGNIFICANT CHANGE UP
EOSINOPHIL # BLD AUTO: 0.01 K/UL — SIGNIFICANT CHANGE UP (ref 0–0.5)
EOSINOPHIL NFR BLD AUTO: 0.1 % — SIGNIFICANT CHANGE UP (ref 0–6)
GLUCOSE UR-MCNC: NEGATIVE — SIGNIFICANT CHANGE UP
HCT VFR BLD CALC: 24.8 % — LOW (ref 34.5–45)
HGB BLD-MCNC: 7.6 G/DL — LOW (ref 11.5–15.5)
IMM GRANULOCYTES # BLD AUTO: 0.05 # — SIGNIFICANT CHANGE UP
IMM GRANULOCYTES NFR BLD AUTO: 0.5 % — SIGNIFICANT CHANGE UP (ref 0–1.5)
KETONES UR-MCNC: HIGH
LEUKOCYTE ESTERASE UR-ACNC: SIGNIFICANT CHANGE UP
LYMPHOCYTES # BLD AUTO: 1.2 K/UL — SIGNIFICANT CHANGE UP (ref 1–3.3)
LYMPHOCYTES # BLD AUTO: 11.5 % — LOW (ref 13–44)
MCHC RBC-ENTMCNC: 25.9 PG — LOW (ref 27–34)
MCHC RBC-ENTMCNC: 30.6 % — LOW (ref 32–36)
MCV RBC AUTO: 84.4 FL — SIGNIFICANT CHANGE UP (ref 80–100)
MONOCYTES # BLD AUTO: 0.8 K/UL — SIGNIFICANT CHANGE UP (ref 0–0.9)
MONOCYTES NFR BLD AUTO: 7.6 % — SIGNIFICANT CHANGE UP (ref 2–14)
NEUTROPHILS # BLD AUTO: 8.37 K/UL — HIGH (ref 1.8–7.4)
NEUTROPHILS NFR BLD AUTO: 80 % — HIGH (ref 43–77)
NITRITE UR-MCNC: NEGATIVE — SIGNIFICANT CHANGE UP
NRBC # FLD: 0 — SIGNIFICANT CHANGE UP
PH UR: 6.5 — SIGNIFICANT CHANGE UP (ref 5–8)
PLATELET # BLD AUTO: 190 K/UL — SIGNIFICANT CHANGE UP (ref 150–400)
PMV BLD: 11.8 FL — SIGNIFICANT CHANGE UP (ref 7–13)
PROT UR-MCNC: 10 — SIGNIFICANT CHANGE UP
RBC # BLD: 2.94 M/UL — LOW (ref 3.8–5.2)
RBC # FLD: 14.5 % — SIGNIFICANT CHANGE UP (ref 10.3–14.5)
RBC CASTS # UR COMP ASSIST: SIGNIFICANT CHANGE UP (ref 0–?)
SP GR SPEC: 1.01 — SIGNIFICANT CHANGE UP (ref 1–1.04)
SQUAMOUS # UR AUTO: SIGNIFICANT CHANGE UP
UROBILINOGEN FLD QL: NORMAL — SIGNIFICANT CHANGE UP
WBC # BLD: 10.46 K/UL — SIGNIFICANT CHANGE UP (ref 3.8–10.5)
WBC # FLD AUTO: 10.46 K/UL — SIGNIFICANT CHANGE UP (ref 3.8–10.5)
WBC UR QL: SIGNIFICANT CHANGE UP (ref 0–?)

## 2018-12-19 RX ORDER — SIMETHICONE 80 MG/1
80 TABLET, CHEWABLE ORAL DAILY
Qty: 0 | Refills: 0 | Status: DISCONTINUED | OUTPATIENT
Start: 2018-12-19 | End: 2018-12-20

## 2018-12-19 RX ORDER — IBUPROFEN 200 MG
600 TABLET ORAL EVERY 6 HOURS
Qty: 0 | Refills: 0 | Status: DISCONTINUED | OUTPATIENT
Start: 2018-12-19 | End: 2018-12-20

## 2018-12-19 RX ADMIN — Medication 100 MILLIGRAM(S): at 05:56

## 2018-12-19 RX ADMIN — HEPARIN SODIUM 5000 UNIT(S): 5000 INJECTION INTRAVENOUS; SUBCUTANEOUS at 09:04

## 2018-12-19 RX ADMIN — Medication 600 MILLIGRAM(S): at 23:46

## 2018-12-19 RX ADMIN — Medication 650 MILLIGRAM(S): at 17:04

## 2018-12-19 RX ADMIN — Medication 100 MILLIGRAM(S): at 13:42

## 2018-12-19 RX ADMIN — Medication 600 MILLIGRAM(S): at 17:04

## 2018-12-19 RX ADMIN — HEPARIN SODIUM 5000 UNIT(S): 5000 INJECTION INTRAVENOUS; SUBCUTANEOUS at 20:19

## 2018-12-19 RX ADMIN — Medication 650 MILLIGRAM(S): at 12:33

## 2018-12-19 RX ADMIN — OXYCODONE HYDROCHLORIDE 10 MILLIGRAM(S): 5 TABLET ORAL at 02:21

## 2018-12-19 RX ADMIN — Medication 600 MILLIGRAM(S): at 12:33

## 2018-12-19 RX ADMIN — Medication 600 MILLIGRAM(S): at 17:40

## 2018-12-19 RX ADMIN — Medication 650 MILLIGRAM(S): at 23:46

## 2018-12-19 RX ADMIN — Medication 650 MILLIGRAM(S): at 01:50

## 2018-12-19 RX ADMIN — OXYCODONE HYDROCHLORIDE 10 MILLIGRAM(S): 5 TABLET ORAL at 01:21

## 2018-12-19 RX ADMIN — Medication 650 MILLIGRAM(S): at 01:20

## 2018-12-19 RX ADMIN — Medication 600 MILLIGRAM(S): at 11:45

## 2018-12-19 RX ADMIN — Medication 650 MILLIGRAM(S): at 17:40

## 2018-12-19 RX ADMIN — Medication 650 MILLIGRAM(S): at 06:45

## 2018-12-19 RX ADMIN — Medication 650 MILLIGRAM(S): at 11:44

## 2018-12-19 RX ADMIN — Medication 100 MILLIGRAM(S): at 21:56

## 2018-12-19 RX ADMIN — SIMETHICONE 80 MILLIGRAM(S): 80 TABLET, CHEWABLE ORAL at 11:44

## 2018-12-19 NOTE — PROGRESS NOTE ADULT - SUBJECTIVE AND OBJECTIVE BOX
POD# 2  HD# 3    Overnight, patient was febrile to 38.4 (12/19 @2a) and 38.3 (12/19 @5a). Blood cultures, Urine cultures, UA, and CBC collected. Patient seen and examined at bedside. No acute complaints, pain well controlled. Patient denies feeling feverish, chills. Patient is ambulating. Voiding spontaneously. Not yet passing flatus. Tolerating clears diet. Denies CP, SOB, N/V.    Vital Signs Last 24 Hours  T(C): 38.3 (12-19-18 @ 05:57), Max: 38.4 (12-19-18 @ 02:11)  HR: 72 (12-19-18 @ 05:57) (72 - 102)  BP: 119/61 (12-19-18 @ 05:57) (105/44 - 150/47)  RR: 20 (12-19-18 @ 05:57) (17 - 20)  SpO2: 96% (12-19-18 @ 05:57) (96% - 100%)    I&O's Summary    18 Dec 2018 07:01  -  19 Dec 2018 07:00  --------------------------------------------------------  IN: 1400 mL / OUT: 1050 mL / NET: 350 mL      Physical Exam:  General: NAD  CV: NR, RR, S1, S2, no M/R/G  Lungs: CTA-B  Abdomen: Soft, appropriately tender, non-distended, +BS, midline incision with staples c/d/i  Ext: No pain or swelling    Labs:                        7.6    10.46 )-----------( 190      ( 19 Dec 2018 03:02 )             24.8   baso 0.3    eos 0.1    imm gran 0.5    lymph 11.5   mono 7.6    poly 80.0                         8.6    9.39  )-----------( 214      ( 18 Dec 2018 11:06 )             27.9   baso x      eos x      imm gran x      lymph x      mono x      poly x                            8.3    9.57  )-----------( 209      ( 18 Dec 2018 06:30 )             25.5   baso x      eos x      imm gran x      lymph x      mono x      poly x          MEDICATIONS  (STANDING):  acetaminophen   Tablet .. 650 milliGRAM(s) Oral every 6 hours  docusate sodium 100 milliGRAM(s) Oral three times a day  heparin  Injectable 5000 Unit(s) SubCutaneous every 12 hours  lactated ringers. 1000 milliLiter(s) (30 mL/Hr) IV Continuous <Continuous>  lactated ringers. 1000 milliLiter(s) (125 mL/Hr) IV Continuous <Continuous>    MEDICATIONS  (PRN):  oxyCODONE    IR 5 milliGRAM(s) Oral every 3 hours PRN Moderate Pain (4 - 6)  oxyCODONE    IR 10 milliGRAM(s) Oral every 3 hours PRN Severe Pain (7 - 10)

## 2018-12-19 NOTE — CHART NOTE - NSCHARTNOTEFT_GEN_A_CORE
Pt seen and examined at bedside for temperature of 38.4. Pt without complaints.  She denies nausea/vomiting/chills/chest pain/SOB/dizziness.    MEDICATIONS  (STANDING):  acetaminophen   Tablet .. 650 milliGRAM(s) Oral every 6 hours  docusate sodium 100 milliGRAM(s) Oral three times a day  heparin  Injectable 5000 Unit(s) SubCutaneous every 12 hours  lactated ringers. 1000 milliLiter(s) (30 mL/Hr) IV Continuous <Continuous>  lactated ringers. 1000 milliLiter(s) (125 mL/Hr) IV Continuous <Continuous>    MEDICATIONS  (PRN):  oxyCODONE    IR 5 milliGRAM(s) Oral every 3 hours PRN Moderate Pain (4 - 6)  oxyCODONE    IR 10 milliGRAM(s) Oral every 3 hours PRN Severe Pain (7 - 10)      12 point ROS negative except as outlined above    Vital Signs Last 24 Hrs  T(C): 38.4 (19 Dec 2018 02:11), Max: 38.4 (19 Dec 2018 02:11)  T(F): 101.1 (19 Dec 2018 02:11), Max: 101.1 (19 Dec 2018 02:11)  HR: 102 (19 Dec 2018 02:11) (79 - 102)  BP: 145/49 (19 Dec 2018 02:11) (105/44 - 150/47)  BP(mean): --  RR: 18 (19 Dec 2018 02:11) (16 - 18)  SpO2: 97% (19 Dec 2018 02:11) (97% - 100%)    I&O's Summary    17 Dec 2018 07:01  -  18 Dec 2018 07:00  --------------------------------------------------------  IN: 1400 mL / OUT: 750 mL / NET: 650 mL    18 Dec 2018 07:01  -  19 Dec 2018 04:38  --------------------------------------------------------  IN: 1400 mL / OUT: 1050 mL / NET: 350 mL          PHYSICAL EXAM:    GA: NAD, A+0 x 3  CV: RRR  Pulm: CTA BL  Abd: ( + ) BS, soft, appropriately tender, nondistended, no rebound or guarding,   Incision: clean, dry and intact; steri-strips in place  : minimal bleeding  Extremities: no swelling or calf tenderness  Lines:      LABS:                          7.6    10.46 )-----------( 190      ( 19 Dec 2018 03:02 )             24.8   baso 0.3    eos 0.1    imm gran 0.5    lymph 11.5   mono 7.6    poly 80.0                         8.6    9.39  )-----------( 214      ( 18 Dec 2018 11:06 )             27.9   baso x      eos x      imm gran x      lymph x      mono x      poly x                            8.3    9.57  )-----------( 209      ( 18 Dec 2018 06:30 )             25.5   baso x      eos x      imm gran x      lymph x      mono x      poly x          Will draw CBC, blood Cx, UA, and UCx    Jerad Alejandro MD PGY2

## 2018-12-19 NOTE — PROGRESS NOTE ADULT - ATTENDING COMMENTS
Above noted. Feels better, tolerating a regular diet passing flatus, no bowel movement.  Plan: As per GYN.

## 2018-12-19 NOTE — PROGRESS NOTE ADULT - ASSESSMENT
37yo F with h/o myomectomy for fibroids, now POD 2  s/p exlap, myomectomies, intraop consult for adhesions to sigmoid and bladder. Adhesions were lysed, L ureter was lysed to identify course. Febrile overnight but otherwise toelrating clears    - Care per Gyn  - Pain control  - Diet to be advanced as tolerated  - Ambulate as tolerated  - Reconsult as needed  - Plan discussed with Dr. Rachelle Bonner PGY-2  [id number]

## 2018-12-19 NOTE — PROVIDER CONTACT NOTE (OTHER) - RECOMMENDATIONS
Diet changed to clears. CBC to be drawn at 11am. possible transfusion
MD notified
MD notified
tylenol

## 2018-12-19 NOTE — PROVIDER CONTACT NOTE (OTHER) - ASSESSMENT
pt axox4; states she's feeling lightheaded and dizzy. C/o nausea before attempting to eat breakfast. given zofran IVP. symptoms persist. see flowsheet for vitals.
HR: 102 T: 101.1 denies chest pain and SOB
T: 100.9, denies SOB and dizziness
pt resting in bed, sleeping

## 2018-12-19 NOTE — PROGRESS NOTE ADULT - SUBJECTIVE AND OBJECTIVE BOX
Patient evaluated at bedside. Reports feeling much better. Denies shortness of breath, chest pain, palpitations, nausea or vomiting. No vaginal bleeding. Tolerating regular diet. Ambulating. + flatus    VS T99 Tm 101.1  P 69  /56    Heent nl  CV RRR  Abd soft  Incision C/D/I  Ext no calf tenderness b/l  Neuro AAo x 3

## 2018-12-19 NOTE — PROGRESS NOTE ADULT - PROBLEM SELECTOR PLAN 1
Neuro:   - PO pain medications (Oxycodone, Tylenol/Motrin).   CV: Hemodynamically stable  Pulm: Saturating well on room air, encourage oob/amb  GI: Advance to regular diet     : voiding spontaneously  Heme:   - HSQ/SCDs/OOB for DVT ppx  - F/U CBC  ID: febrile overnight 12/19 x2  - F/U BCx, UCx, UA  Dispo: Continue routine post-op care    Nora Michelle PGY-1

## 2018-12-19 NOTE — PROGRESS NOTE ADULT - ASSESSMENT
A: POD#2 s/p abdominal myomectomy, postop fever, clinically stable  P: Encourage ambulation and hydration     Motrin OTC     Source of fever most likely due to myomas     Will continue to monitor     DC planning in AM

## 2018-12-19 NOTE — PROGRESS NOTE ADULT - SUBJECTIVE AND OBJECTIVE BOX
B Team (General Surgery) Daily Progress Note    SUBJECTIVE:  Pt seen and examined this AM. Overnight febrile, cultures sent. Tolerated clears yesterday.     OBJECTIVE:  Vital Signs Last 24 Hrs  T(C): 38.3 (19 Dec 2018 05:57), Max: 38.4 (19 Dec 2018 02:11)  T(F): 100.9 (19 Dec 2018 05:57), Max: 101.1 (19 Dec 2018 02:11)  HR: 72 (19 Dec 2018 05:57) (72 - 102)  BP: 119/61 (19 Dec 2018 05:57) (105/44 - 150/47)  BP(mean): --  RR: 20 (19 Dec 2018 05:57) (17 - 20)  SpO2: 96% (19 Dec 2018 05:57) (96% - 100%)    I&O's Detail    17 Dec 2018 07:01  -  18 Dec 2018 07:00  --------------------------------------------------------  IN:    IV PiggyBack: 150 mL    lactated ringers.: 1000 mL    lactated ringers.: 250 mL  Total IN: 1400 mL    OUT:    Indwelling Catheter - Urethral: 750 mL  Total OUT: 750 mL    Total NET: 650 mL      18 Dec 2018 07:01  -  19 Dec 2018 06:34  --------------------------------------------------------  IN:    lactated ringers.: 150 mL    lactated ringers.: 1250 mL  Total IN: 1400 mL    OUT:    Voided: 1050 mL  Total OUT: 1050 mL    Total NET: 350 mL        Exam:  GEN: A&Ox3, NAD  HEENT: atraumatic, normocephalic  RESP: no increased work of breathing, no use of accessory muscles  GI/ABD: soft, appropriately tender, mildly distended, no rebound or guarding, incision is c/d/i  : deferred  EXTREMITIES: MUNGUIA, warm, pink, well-perfused                        7.6    10.46 )-----------( 190      ( 19 Dec 2018 03:02 )             24.8

## 2018-12-20 VITALS
HEART RATE: 66 BPM | SYSTOLIC BLOOD PRESSURE: 123 MMHG | TEMPERATURE: 98 F | OXYGEN SATURATION: 100 % | DIASTOLIC BLOOD PRESSURE: 47 MMHG | RESPIRATION RATE: 18 BRPM

## 2018-12-20 DIAGNOSIS — D25.9 LEIOMYOMA OF UTERUS, UNSPECIFIED: ICD-10-CM

## 2018-12-20 LAB
BASOPHILS # BLD AUTO: 0.02 K/UL — SIGNIFICANT CHANGE UP (ref 0–0.2)
BASOPHILS NFR BLD AUTO: 0.3 % — SIGNIFICANT CHANGE UP (ref 0–2)
EOSINOPHIL # BLD AUTO: 0.33 K/UL — SIGNIFICANT CHANGE UP (ref 0–0.5)
EOSINOPHIL NFR BLD AUTO: 4.3 % — SIGNIFICANT CHANGE UP (ref 0–6)
HCT VFR BLD CALC: 21.2 % — LOW (ref 34.5–45)
HGB BLD-MCNC: 6.5 G/DL — CRITICAL LOW (ref 11.5–15.5)
IMM GRANULOCYTES # BLD AUTO: 0.03 # — SIGNIFICANT CHANGE UP
IMM GRANULOCYTES NFR BLD AUTO: 0.4 % — SIGNIFICANT CHANGE UP (ref 0–1.5)
LYMPHOCYTES # BLD AUTO: 1.63 K/UL — SIGNIFICANT CHANGE UP (ref 1–3.3)
LYMPHOCYTES # BLD AUTO: 21.4 % — SIGNIFICANT CHANGE UP (ref 13–44)
MCHC RBC-ENTMCNC: 26.2 PG — LOW (ref 27–34)
MCHC RBC-ENTMCNC: 30.7 % — LOW (ref 32–36)
MCV RBC AUTO: 85.5 FL — SIGNIFICANT CHANGE UP (ref 80–100)
MONOCYTES # BLD AUTO: 0.62 K/UL — SIGNIFICANT CHANGE UP (ref 0–0.9)
MONOCYTES NFR BLD AUTO: 8.1 % — SIGNIFICANT CHANGE UP (ref 2–14)
NEUTROPHILS # BLD AUTO: 4.98 K/UL — SIGNIFICANT CHANGE UP (ref 1.8–7.4)
NEUTROPHILS NFR BLD AUTO: 65.5 % — SIGNIFICANT CHANGE UP (ref 43–77)
NRBC # FLD: 0 — SIGNIFICANT CHANGE UP
PLATELET # BLD AUTO: 203 K/UL — SIGNIFICANT CHANGE UP (ref 150–400)
PMV BLD: 12.1 FL — SIGNIFICANT CHANGE UP (ref 7–13)
RBC # BLD: 2.48 M/UL — LOW (ref 3.8–5.2)
RBC # FLD: 14.2 % — SIGNIFICANT CHANGE UP (ref 10.3–14.5)
SPECIMEN SOURCE: SIGNIFICANT CHANGE UP
SPECIMEN SOURCE: SIGNIFICANT CHANGE UP
WBC # BLD: 7.61 K/UL — SIGNIFICANT CHANGE UP (ref 3.8–10.5)
WBC # FLD AUTO: 7.61 K/UL — SIGNIFICANT CHANGE UP (ref 3.8–10.5)

## 2018-12-20 RX ORDER — OXYCODONE HYDROCHLORIDE 5 MG/1
1 TABLET ORAL
Qty: 20 | Refills: 0 | OUTPATIENT
Start: 2018-12-20 | End: 2018-12-24

## 2018-12-20 RX ORDER — ASCORBIC ACID 60 MG
1 TABLET,CHEWABLE ORAL
Qty: 0 | Refills: 0 | COMMUNITY

## 2018-12-20 RX ORDER — IBUPROFEN 200 MG
1 TABLET ORAL
Qty: 0 | Refills: 0 | COMMUNITY
Start: 2018-12-20

## 2018-12-20 RX ORDER — FERROUS SULFATE 325(65) MG
1 TABLET ORAL
Qty: 0 | Refills: 0 | COMMUNITY

## 2018-12-20 RX ADMIN — Medication 100 MILLIGRAM(S): at 05:43

## 2018-12-20 RX ADMIN — Medication 600 MILLIGRAM(S): at 05:42

## 2018-12-20 RX ADMIN — Medication 650 MILLIGRAM(S): at 05:43

## 2018-12-20 NOTE — PROGRESS NOTE ADULT - SUBJECTIVE AND OBJECTIVE BOX
Patient seen and examined at bedside, no acute overnight events. No fevers, no acute complaints, pain well controlled.  Patient is ambulating, passing flatus, voiding spontaneously, and tolerating regular diet. Denies CP, SOB, N/V, fevers, and chills.    Vital Signs Last 24 Hours  T(C): 36.8 (12-20-18 @ 05:45), Max: 37.3 (12-20-18 @ 01:21)  HR: 80 (12-20-18 @ 05:45) (69 - 98)  BP: 127/67 (12-20-18 @ 05:45) (122/50 - 135/59)  RR: 20 (12-20-18 @ 05:45) (18 - 20)  SpO2: 100% (12-20-18 @ 05:45) (96% - 100%)    I&O's Summary    18 Dec 2018 07:01  -  19 Dec 2018 07:00  --------------------------------------------------------  IN: 1400 mL / OUT: 1050 mL / NET: 350 mL    19 Dec 2018 07:01  -  20 Dec 2018 06:59  --------------------------------------------------------  IN: 0 mL / OUT: 400 mL / NET: -400 mL    Physical Exam:  General: NAD  CV: NR, RR, S1, S2, no M/R/G  Lungs: CTA-B  Abdomen: Soft, non-tender, non-distended, +BS  Incision: midline vertical CDI with staples  Ext: No pain or swelling    Labs:             7.6    10.46 )-----------( 190      ( 12-19 @ 03:02 )             24.8                8.6    9.39  )-----------( 214      ( 12-18 @ 11:06 )             27.9                8.3    9.57  )-----------( 209      ( 12-18 @ 06:30 )             25.5         MEDICATIONS  (STANDING):  docusate sodium 100 milliGRAM(s) Oral three times a day  heparin  Injectable 5000 Unit(s) SubCutaneous every 12 hours  ibuprofen  Tablet. 600 milliGRAM(s) Oral every 6 hours  simethicone 80 milliGRAM(s) Chew daily    MEDICATIONS  (PRN):  oxyCODONE    IR 5 milliGRAM(s) Oral every 3 hours PRN Moderate Pain (4 - 6)  oxyCODONE    IR 10 milliGRAM(s) Oral every 3 hours PRN Severe Pain (7 - 10)

## 2018-12-20 NOTE — PROGRESS NOTE ADULT - PROBLEM SELECTOR PLAN 1
Neuro: c/w po pain meds  CV: Hemodynamically stable  Pulm: Saturating well on room air, encourage incentive spirometry  GI: c/w regular diet  : voiding spontaneously  Heme: c/w HSQ and SCDs for DVT ppx  Dispo: discharge planning

## 2018-12-24 LAB
BACTERIA BLD CULT: SIGNIFICANT CHANGE UP
BACTERIA BLD CULT: SIGNIFICANT CHANGE UP

## 2018-12-27 LAB — SURGICAL PATHOLOGY STUDY: SIGNIFICANT CHANGE UP

## 2022-09-09 NOTE — H&P PST ADULT - NSALCOHOLFREQ_GEN_A_CORE_SD
Physical Therapy Treatment Note     Name: Mally Woods  Clinic Number: [id number]    Therapy Diagnosis:   Encounter Diagnosis   Name Primary?    Cervical facet syndrome Yes     Physician: Rebeca Walter FNP    Visit Date: 9/9/2022    Physician Orders: PT Eval and Treat neck pain  Medical Diagnosis from Referral:M47.812 (ICD-10-CM) - Cervical facet syndrome   Evaluation Date: 8/19/2022  Authorization Period Expiration: 12/30/2022  Plan of Care Expiration: 10/14/2022  Visit # / Visits authorized: 3/ 20  PTA Visit #:     Time In: 1445   Time Out: 1530  Total Billable Time: 46 minutes    Precautions: Standard    Received Plan of Care per Po Diaz PT     Subjective     Pt reports: getting better pain is lessening , pt voices prolonged sitting makes her neck worse   She was compliant with home exercise program.  Response to previous treatment: no c/o    Pain: 3/10  Location: bilateral neck      Objective     Mally received therapeutic exercises to develop strength, ROM, flexibility, and posture for 33 minutes including:    UBE x 4 minutes   Pulleys x 4  minutes   Doorway pec stretching, 6x10 second hold   Towel slides on wall for bilateral upper extremity flexion x 10 repetitions   Bilateral scapular retraction with rows, extension x 15 repetitions each  Cervical stretches: rotation, lateral flexion, levator scapulae bilaterally at 10 second hold x 6 repetitions each  Bilateral sternocleidomastoid stretch x 10 reps with manual hold .        Cervical range of motion measures:    Rotation Right 50 degrees  Left 50 degrees    Lateral tilt Right 45 degrees Left 45 degrees        Mally received the following direct contact modalities after being cleared for contraindications: Ultrasound:  Mally received ultrasound to manage pain and inflammation at 100 % duty cycle applied to the bilateral cervical paraspinals at an intensity of  1.5 W/cm2  for a duration of 8 minutes. Patient tolerated treatment well without  adverse effects. Therapist was in attendance throughout intervention.    Pt received ifc to upper back and neck at an intensity of 9 x 20 min with mhp . Pt had no adverse reaction with heat or estim.      Home Exercises Provided and Patient Education Provided     Education provided: continue current home exercise program, pain free; has therabands and will add scapular retraction rows, extensions; encouraged pt to set a timer for reminders at work to do posturing exercises and neck range of motion at her desk throughout the day.    Written Home Exercises Provided: Patient instructed to cont prior HEP.  Exercises were reviewed and Mally was able to demonstrate them prior to the end of the session.  Mally demonstrated good  understanding of the education provided.     See EMR under Patient Instructions for exercises provided prior visit.    Assessment     Pt reports feeling better post treatment; neck range of motion measures as noted  Mally Is progressing well towards her goals.   Pt prognosis is Good.     Pt will continue to benefit from skilled outpatient physical therapy to address the deficits listed in the problem list box on initial evaluation, provide pt/family education and to maximize pt's level of independence in the home and community environment.     Anticipated barriers to physical therapy: home exercise program compliance    Goals:  1. Patient will be independent with home exercise program  2. Patient will report decrease in pain at worse to 3/10 to improve quality of life  3. Patient will hold correct posture independently for 1 minute to improve ergonomic positioning at work    Plan     Plan of care Certification: 8/19/2022 to 10/14/2022.     Outpatient Physical Therapy 2 times weekly for 8 weeks to include the following interventions: Cervical/Lumbar Traction, Electrical Stimulation ifc/premod, Manual Therapy, Moist Heat/ Ice, Neuromuscular Re-ed, Patient Education, Therapeutic Activities,  Therapeutic Exercise and Ultrasound.     Continue per Plan of Care   Po Diaz, PT  9/9/2022            2-4 times/mo

## 2022-10-05 NOTE — H&P PST ADULT - DOCUMENT STATUS
Authored by Resident/PA/NP Rifampin Counseling: I discussed with the patient the risks of rifampin including but not limited to liver damage, kidney damage, red-orange body fluids, nausea/vomiting and severe allergy.

## 2024-08-19 NOTE — H&P PST ADULT - NSANTHBPHIGHRD_ENT_A_CORE
Continue current asthma regiment including Trelegy once per day and Albuterol as needed for shortness of breath, wheezing, cough.    Continue to use nebulized treatments as needed for mucous production.   Can increase use to 3x per day as needed.    Use Aerobeka device - I recommend ten breaths per day.    I have ordered sputum cultures - you will leave the office today with sputum specimen cups. Bring to any [facility] within 4 hours of obtaining specimen. Rinse your mouth prior to collecting sample. Please collect sample in the morning by deep coughing prior to eating or drinking.    Codeine cough syrup prescribed - to be taken only as needed for cough. This will make you drowsy, do not drive or operative heavy machinery after use. Do not take with other sedating medications. Do not mix with alcohol. Utilize fall precautions with use.     Standing order for chest xray placed into the system.    You've had several positive sputum/AFB Cultures in the past - no MAC identified. You submitted AFB culture 8/13 - will await results and treat as needed.    Continue current prescription medication regiment. Keep follow up appointment as scheduled. Please call the office if you have any questions or concerns.   
No